# Patient Record
Sex: FEMALE | Race: AMERICAN INDIAN OR ALASKA NATIVE
[De-identification: names, ages, dates, MRNs, and addresses within clinical notes are randomized per-mention and may not be internally consistent; named-entity substitution may affect disease eponyms.]

---

## 2018-08-22 ENCOUNTER — HOSPITAL ENCOUNTER (INPATIENT)
Dept: HOSPITAL 5 - ED | Age: 68
LOS: 5 days | Discharge: NURSING, REHAB OR LONG-TERM CARE | DRG: 64 | End: 2018-08-27
Attending: INTERNAL MEDICINE | Admitting: INTERNAL MEDICINE
Payer: MEDICARE

## 2018-08-22 DIAGNOSIS — I10: ICD-10-CM

## 2018-08-22 DIAGNOSIS — E11.65: ICD-10-CM

## 2018-08-22 DIAGNOSIS — G81.94: ICD-10-CM

## 2018-08-22 DIAGNOSIS — G93.40: ICD-10-CM

## 2018-08-22 DIAGNOSIS — Z79.82: ICD-10-CM

## 2018-08-22 DIAGNOSIS — I63.9: Primary | ICD-10-CM

## 2018-08-22 DIAGNOSIS — Z79.899: ICD-10-CM

## 2018-08-22 DIAGNOSIS — Z82.49: ICD-10-CM

## 2018-08-22 DIAGNOSIS — E66.9: ICD-10-CM

## 2018-08-22 DIAGNOSIS — F03.90: ICD-10-CM

## 2018-08-22 DIAGNOSIS — I42.9: ICD-10-CM

## 2018-08-22 DIAGNOSIS — R29.702: ICD-10-CM

## 2018-08-22 LAB
APTT BLD: 26.9 SEC. (ref 24.2–36.6)
BASOPHILS # (AUTO): 0 K/MM3 (ref 0–0.1)
BASOPHILS NFR BLD AUTO: 0.4 % (ref 0–1.8)
BILIRUB UR QL STRIP: (no result)
BLOOD UR QL VISUAL: (no result)
BUN SERPL-MCNC: 10 MG/DL (ref 7–17)
BUN/CREAT SERPL: 17 %
CALCIUM SERPL-MCNC: 10.1 MG/DL (ref 8.4–10.2)
EOSINOPHIL # BLD AUTO: 0.1 K/MM3 (ref 0–0.4)
EOSINOPHIL NFR BLD AUTO: 1.2 % (ref 0–4.3)
HCT VFR BLD CALC: 46.6 % (ref 30.3–42.9)
HEMOLYSIS INDEX: 52
HGB BLD-MCNC: 15.5 GM/DL (ref 10.1–14.3)
HYALINE CASTS #/AREA URNS LPF: 1 /LPF
INR PPP: 1.12 (ref 0.87–1.13)
LYMPHOCYTES # BLD AUTO: 2.4 K/MM3 (ref 1.2–5.4)
LYMPHOCYTES NFR BLD AUTO: 28.9 % (ref 13.4–35)
MCH RBC QN AUTO: 27 PG (ref 28–32)
MCHC RBC AUTO-ENTMCNC: 33 % (ref 30–34)
MCV RBC AUTO: 80 FL (ref 79–97)
MONOCYTES # (AUTO): 0.4 K/MM3 (ref 0–0.8)
MONOCYTES % (AUTO): 4.6 % (ref 0–7.3)
MUCOUS THREADS #/AREA URNS HPF: (no result) /HPF
PH UR STRIP: 5 [PH] (ref 5–7)
PLATELET # BLD: 224 K/MM3 (ref 140–440)
PROT UR STRIP-MCNC: (no result) MG/DL
RBC # BLD AUTO: 5.79 M/MM3 (ref 3.65–5.03)
RBC #/AREA URNS HPF: 2 /HPF (ref 0–6)
T4 FREE SERPL-MCNC: 1.28 NG/DL (ref 0.76–1.46)
UROBILINOGEN UR-MCNC: < 2 MG/DL (ref ?–2)
WBC #/AREA URNS HPF: 2 /HPF (ref 0–6)

## 2018-08-22 PROCEDURE — 80061 LIPID PANEL: CPT

## 2018-08-22 PROCEDURE — 87086 URINE CULTURE/COLONY COUNT: CPT

## 2018-08-22 PROCEDURE — 72125 CT NECK SPINE W/O DYE: CPT

## 2018-08-22 PROCEDURE — 83735 ASSAY OF MAGNESIUM: CPT

## 2018-08-22 PROCEDURE — 84443 ASSAY THYROID STIM HORMONE: CPT

## 2018-08-22 PROCEDURE — 82550 ASSAY OF CK (CPK): CPT

## 2018-08-22 PROCEDURE — 70450 CT HEAD/BRAIN W/O DYE: CPT

## 2018-08-22 PROCEDURE — 80048 BASIC METABOLIC PNL TOTAL CA: CPT

## 2018-08-22 PROCEDURE — 85670 THROMBIN TIME PLASMA: CPT

## 2018-08-22 PROCEDURE — 36415 COLL VENOUS BLD VENIPUNCTURE: CPT

## 2018-08-22 PROCEDURE — 82962 GLUCOSE BLOOD TEST: CPT

## 2018-08-22 PROCEDURE — 85730 THROMBOPLASTIN TIME PARTIAL: CPT

## 2018-08-22 PROCEDURE — 93306 TTE W/DOPPLER COMPLETE: CPT

## 2018-08-22 PROCEDURE — 84484 ASSAY OF TROPONIN QUANT: CPT

## 2018-08-22 PROCEDURE — 70544 MR ANGIOGRAPHY HEAD W/O DYE: CPT

## 2018-08-22 PROCEDURE — 85610 PROTHROMBIN TIME: CPT

## 2018-08-22 PROCEDURE — 82607 VITAMIN B-12: CPT

## 2018-08-22 PROCEDURE — 84439 ASSAY OF FREE THYROXINE: CPT

## 2018-08-22 PROCEDURE — 85025 COMPLETE CBC W/AUTO DIFF WBC: CPT

## 2018-08-22 PROCEDURE — 82747 ASSAY OF FOLIC ACID RBC: CPT

## 2018-08-22 PROCEDURE — 70551 MRI BRAIN STEM W/O DYE: CPT

## 2018-08-22 PROCEDURE — 83036 HEMOGLOBIN GLYCOSYLATED A1C: CPT

## 2018-08-22 PROCEDURE — 93880 EXTRACRANIAL BILAT STUDY: CPT

## 2018-08-22 PROCEDURE — 81001 URINALYSIS AUTO W/SCOPE: CPT

## 2018-08-22 PROCEDURE — 93010 ELECTROCARDIOGRAM REPORT: CPT

## 2018-08-22 PROCEDURE — 82140 ASSAY OF AMMONIA: CPT

## 2018-08-22 PROCEDURE — 95819 EEG AWAKE AND ASLEEP: CPT

## 2018-08-22 PROCEDURE — 93005 ELECTROCARDIOGRAM TRACING: CPT

## 2018-08-22 RX ADMIN — ACETAMINOPHEN PRN MG: 325 TABLET ORAL at 22:51

## 2018-08-22 NOTE — HISTORY AND PHYSICAL REPORT
History of Present Illness


Chief complaint: 





She is confused and weak


History of present illness: 


68 YO Female with Obesity, HTN presents to ED for evaluation. Pt is unable to 

provide detailed history, but history provided by her daughter who is at 

bedside during exam and interview. As per daughter, the patient has experienced 

confusion, and Left sided weakness over the past 2 days with persistent 

symptoms over the last 1 day.  No reports of fever, chills, CP, Palpitations, 

NVD, Trauma, Falls, skin rash, productive cough, or recent ill contacts. Pt 

seen and evaluated in ED and found to have symptoms consistent with CVA and 

Encephalopathy. Pt is outside therapeutic window for TPA. Pt admitted to 

telemetry and initiated on CVA protocol. 





Past History


Past Medical History: hypertension


Past Surgical History: bowel surgery


Social history: single.  denies: smoking, alcohol abuse, prescription drug abuse


Family history: hypertension





Medications and Allergies


 Allergies











Allergy/AdvReac Type Severity Reaction Status Date / Time


 


No Known Allergies Allergy   Unverified 08/22/18 09:47











 Home Medications











 Medication  Instructions  Recorded  Confirmed  Last Taken  Type


 


Amlodipine Besylate [Norvasc] 10 mg PO QDAY 08/22/18 08/22/18 Unknown History


 


Aspirin [Adult Aspirin] 81 mg PO QDAY 08/22/18 08/22/18 Unknown History


 


Fosinopril Sodium 40 mg PO QDAY 08/22/18 08/22/18 Unknown History


 


Metoprolol Succinate [Toprol Xl] 50 mg PO QDAY 08/22/18 08/22/18 Unknown History














Review of Systems


ROS unobtainable: due to mental status





Exam





- Constitutional


Vitals: 


 











Temp Pulse Resp BP Pulse Ox


 


 98.5 F   97 H  24   165/105   96 


 


 08/22/18 09:40  08/22/18 10:30  08/22/18 10:30  08/22/18 10:30  08/22/18 10:30











General appearance: Present: mild distress





- EENT


ENT: hearing intact, clear oral mucosa





- Neck


Neck: Present: supple, normal ROM





- Respiratory


Respiratory effort: normal


Respiratory: bilateral: CTA





- Cardiovascular


Heart Sounds: Present: S1 & S2.  Absent: rub, click





- Extremities


Extremities: pulses symmetrical, No edema


Peripheral Pulses: within normal limits





- Abdominal


General gastrointestinal: Present: soft, non-tender, non-distended, normal 

bowel sounds


Female genitourinary: Present: normal





- Integumentary


Integumentary: Present: clear, warm, dry





- Musculoskeletal


Musculoskeletal: strength equal bilaterally, left sided weakness





- Psychiatric


Psychiatric: no appropriate mood/affect, no intact judgment & insight, no 

memory intact





- Neurologic


Neurologic: CNII-XII intact, moves all extremities, no gait normal





Results





- Labs


CBC & Chem 7: 


 08/22/18 09:56





 08/22/18 09:56


Labs: 


 Abnormal lab results











  08/22/18 08/22/18 08/22/18 Range/Units





  09:49 09:56 09:56 


 


RBC   5.79 H   (3.65-5.03)  M/mm3


 


Hgb   15.5 H   (10.1-14.3)  gm/dl


 


Hct   46.6 H   (30.3-42.9)  %


 


MCH   27 L   (28-32)  pg


 


RDW   15.3 H   (13.2-15.2)  %


 


PT    15.0 H  (12.2-14.9)  Sec.


 


Carbon Dioxide     (22-30)  mmol/L


 


Creatinine     (0.7-1.2)  mg/dL


 


Glucose     ()  mg/dL


 


POC Glucose  281 H    ()  














  08/22/18 Range/Units





  09:56 


 


RBC   (3.65-5.03)  M/mm3


 


Hgb   (10.1-14.3)  gm/dl


 


Hct   (30.3-42.9)  %


 


MCH   (28-32)  pg


 


RDW   (13.2-15.2)  %


 


PT   (12.2-14.9)  Sec.


 


Carbon Dioxide  20 L  (22-30)  mmol/L


 


Creatinine  0.6 L  (0.7-1.2)  mg/dL


 


Glucose  260 H  ()  mg/dL


 


POC Glucose   ()  














Assessment and Plan





- Patient Problems


(1) CVA (cerebral vascular accident)


Current Visit: Yes   Status: Acute   


Qualifiers: 


   Precerebral and cerebral artery: middle cerebral artery   Laterality of 

affected vessel: right 


Plan to address problem: 


CT Head, MRI brain, MRA Brain, Echo, EEG, CArotid doppler, Lipid panel, statin 

therapy, antiplatelet therapy, seizure precautions, neuro checks. 








(2) Left hemiparesis


Current Visit: Yes   Status: Acute   


Plan to address problem: 


PT consulted, supportive care. 








(3) HTN (hypertension)


Current Visit: Yes   Status: Acute   


Qualifiers: 


   Hypertension type: essential hypertension   Qualified Code(s): I10 - 

Essential (primary) hypertension   


Plan to address problem: 


Monitor BP q shift, permissive hypertension overnight. 








(4) Encephalopathy


Current Visit: Yes   Status: Acute   


Plan to address problem: 


CT head, MRI brain, neuro checks, supportive care, thyroid panel








(5) DVT prophylaxis


Current Visit: Yes   Status: Acute

## 2018-08-22 NOTE — EMERGENCY DEPARTMENT REPORT
ED Neuro Deficit HPI





- General


Chief Complaint: Neuro Symptoms/Deficit


Stated Complaint: LT LEG PAIN


Time Seen by Provider: 08/22/18 10:13


Source: patient, family, RN notes reviewed


Mode of arrival: Wheelchair


Limitations: No Limitations





- History of Present Illness


Initial Comments: 





This is a 67-year-old female who is not known to this provider previously.  The 

patient recently moved here from [place].  She apparently has a history of 

hypertension.  She does not have a local primary care doctor.


History is obtained by speaking to her daughter, Ms. Christiana Kim; [phone]





The patient is brought to the hospital by her daughter for evaluation of 

confusion and altered mental status and left-sided weakness.  The symptoms have 

been going on for the past 2 days.  Apparently they are constant.  The patient 

is altered and cannot describe radiation, exacerbating or relieving factors.  

Patient has been having slurred speech, increased confusion, and an unsteady 

gait, and apparently had a mechanical fall 2 days ago.  No fevers or chills, no 

cough.





-: days(s)


Location: speech, left arm, left leg


Presenting Symptoms: Present: Weak/Paralyzed One Side, Altered Mental Status


History of same: No


Place: home


Severity: moderate


Quality: other


Improves With: other


Worsens With: other


Context: gradual onset, recent fall, other


Associated Symptoms: confusion





- Related Data


Home Medications: 


 Home Medications











 Medication  Instructions  Recorded  Confirmed  Last Taken


 


Amlodipine Besylate [Norvasc] 10 mg PO QDAY 08/22/18 08/22/18 Unknown


 


Aspirin [Adult Aspirin] 81 mg PO QDAY 08/22/18 08/22/18 Unknown


 


Fosinopril Sodium 40 mg PO QDAY 08/22/18 08/22/18 Unknown


 


Metoprolol Succinate [Toprol Xl] 50 mg PO QDAY 08/22/18 08/22/18 Unknown











Allergies/Adverse Reactions: 


 Allergies











Allergy/AdvReac Type Severity Reaction Status Date / Time


 


No Known Allergies Allergy   Unverified 08/22/18 09:47














ED Review of Systems


ROS: 


Stated complaint: LT LEG PAIN


Other details as noted in HPI





Comment: Unobtainable due to pts medical conditions


Constitutional: malaise


Cardiovascular: denies: syncope


Gastrointestinal: denies: vomiting


Neurological: weakness, confusion





ED Past Medical Hx





- Past Medical History


Previous Medical History?: Yes


Hx Hypertension: Yes





- Surgical History


Past Surgical History?: Yes


Additional Surgical History: abdominal surgery





- Social History


Smoking Status: Never Smoker


Substance Use Type: None





- Medications


Home Medications: 


 Home Medications











 Medication  Instructions  Recorded  Confirmed  Last Taken  Type


 


Amlodipine Besylate [Norvasc] 10 mg PO QDAY 08/22/18 08/22/18 Unknown History


 


Aspirin [Adult Aspirin] 81 mg PO QDAY 08/22/18 08/22/18 Unknown History


 


Fosinopril Sodium 40 mg PO QDAY 08/22/18 08/22/18 Unknown History


 


Metoprolol Succinate [Toprol Xl] 50 mg PO QDAY 08/22/18 08/22/18 Unknown History














ED Neuro Physical Exam





- General


Limitations: Altered Mental Status, Physical Limitation


General appearance: alert, in no apparent distress


Suspected Stroke: Yes





- Head


Head exam: Present: atraumatic, normocephalic





- Eye


Eye exam: Present: normal appearance, EOMI, other (visual acuity intact to 

finger counting, color perception, reading at a close distance).  Absent: 

nystagmus





- ENT


ENT exam: Present: normal exam, normal orophraynx, mucous membranes moist, 

normal external ear exam





- Neck


Neck exam: Present: normal inspection, full ROM.  Absent: tenderness, 

meningismus





- Respiratory


Respiratory exam: Present: normal lung sounds bilaterally.  Absent: respiratory 

distress





- Cardiovascular


Cardiovascular Exam: Present: normal rhythm, tachycardia, normal heart sounds.  

Absent: systolic murmur, diastolic murmur, rubs, gallop





- GI/Abdominal


GI/Abdominal exam: Present: soft.  Absent: distended, tenderness, guarding, 

rigid, pulsatile mass





- Extremities Exam


Extremities exam: Present: normal inspection, full ROM, normal capillary refill

, other (2+ pulses noted in the bilateral upper, lower extremities.  

Compartments soft.  No long bony tenderness.  The pelvis is stable.).  Absent: 

pedal edema, joint swelling, calf tenderness





- Back Exam


Back exam: Present: normal inspection, full ROM.  Absent: tenderness, CVA 

tenderness (R), paraspinal tenderness, vertebral tenderness





- Neurological Exam


Neurological exam: Present: alert, oriented X3, motor sensory deficit (there is 

4 out of 5 strength left upper extremity.  There is 5 out of 5 right upper 

extremity, right lower extremity.  There is 5 out of 5 strength left lower 

extremity.  Sensation is intact to light touch in 4 extremities)





- NIHSS


Assessment Interval: Baseline


1a. Level of Consciousness: alert


1b. LOC Questions: answers 1 question correctly


1c. LOC Commands: performs tasks correctly


2. Best Gaze: normal


3. Visual: no visual loss


4. Facial Palsy: normal symmetrical movement


5b. Motor Arm Right: no drift


5a. Motor Arm Left: drift


6a. Motor Leg Left: no drift


6b. Motor Leg Right: no drift


7. Limb Ataxia: absent


8. Sensory: normal


9. Best Language: no aphasia


10. Dysarthria: normal


11. Extinction/Inattention: no abnormality


Total Score: 2


Stroke Severity: Minor Stroke





- Psychiatric


Psychiatric exam: Present: normal affect, normal mood





- Skin


Skin exam: Present: warm, dry, intact, normal color.  Absent: rash





ED Course


 Vital Signs











  08/22/18 08/22/18





  09:40 10:30


 


Temperature 98.5 F 


 


Pulse Rate 100 H 97 H


 


Respiratory 20 24





Rate  


 


Blood Pressure 192/108 165/105


 


O2 Sat by Pulse 98 96





Oximetry  














- Reevaluation(s)


Reevaluation #1: 





08/22/18 12:03


CT scan of the brain is negative for acute disease.  CT scan of the cervical 

spine is negative for acute disease.  The Hospital physician, Dr. Ye 

accepted the patient to the medical service.





- Lab Data


Result diagrams: 


 08/22/18 09:56





 08/22/18 09:56


 Lab Results











  08/22/18 08/22/18 08/22/18 Range/Units





  09:49 09:56 09:56 


 


WBC   8.3   (4.5-11.0)  K/mm3


 


RBC   5.79 H   (3.65-5.03)  M/mm3


 


Hgb   15.5 H   (10.1-14.3)  gm/dl


 


Hct   46.6 H   (30.3-42.9)  %


 


MCV   80   (79-97)  fl


 


MCH   27 L   (28-32)  pg


 


MCHC   33   (30-34)  %


 


RDW   15.3 H   (13.2-15.2)  %


 


Plt Count   224   (140-440)  K/mm3


 


Lymph % (Auto)   28.9   (13.4-35.0)  %


 


Mono % (Auto)   4.6   (0.0-7.3)  %


 


Eos % (Auto)   1.2   (0.0-4.3)  %


 


Baso % (Auto)   0.4   (0.0-1.8)  %


 


Lymph #   2.4   (1.2-5.4)  K/mm3


 


Mono #   0.4   (0.0-0.8)  K/mm3


 


Eos #   0.1   (0.0-0.4)  K/mm3


 


Baso #   0.0   (0.0-0.1)  K/mm3


 


Seg Neutrophils %   64.9   (40.0-70.0)  %


 


Seg Neutrophils #   5.4   (1.8-7.7)  K/mm3


 


PT    15.0 H  (12.2-14.9)  Sec.


 


INR    1.12  (0.87-1.13)  


 


APTT    26.9  (24.2-36.6)  Sec.


 


Thrombin Time    17.0  (15.1-19.6)  Sec.


 


Sodium     (137-145)  mmol/L


 


Potassium     (3.6-5.0)  mmol/L


 


Chloride     ()  mmol/L


 


Carbon Dioxide     (22-30)  mmol/L


 


Anion Gap     mmol/L


 


BUN     (7-17)  mg/dL


 


Creatinine     (0.7-1.2)  mg/dL


 


Estimated GFR     ml/min


 


BUN/Creatinine Ratio     %


 


Glucose     ()  mg/dL


 


POC Glucose  281 H    ()  


 


Calcium     (8.4-10.2)  mg/dL


 


Magnesium     (1.7-2.3)  mg/dL


 


Total Creatine Kinase     ()  units/L


 


Troponin T     (0.00-0.029)  ng/mL














  08/22/18 08/22/18 Range/Units





  09:56 09:56 


 


WBC    (4.5-11.0)  K/mm3


 


RBC    (3.65-5.03)  M/mm3


 


Hgb    (10.1-14.3)  gm/dl


 


Hct    (30.3-42.9)  %


 


MCV    (79-97)  fl


 


MCH    (28-32)  pg


 


MCHC    (30-34)  %


 


RDW    (13.2-15.2)  %


 


Plt Count    (140-440)  K/mm3


 


Lymph % (Auto)    (13.4-35.0)  %


 


Mono % (Auto)    (0.0-7.3)  %


 


Eos % (Auto)    (0.0-4.3)  %


 


Baso % (Auto)    (0.0-1.8)  %


 


Lymph #    (1.2-5.4)  K/mm3


 


Mono #    (0.0-0.8)  K/mm3


 


Eos #    (0.0-0.4)  K/mm3


 


Baso #    (0.0-0.1)  K/mm3


 


Seg Neutrophils %    (40.0-70.0)  %


 


Seg Neutrophils #    (1.8-7.7)  K/mm3


 


PT    (12.2-14.9)  Sec.


 


INR    (0.87-1.13)  


 


APTT    (24.2-36.6)  Sec.


 


Thrombin Time    (15.1-19.6)  Sec.


 


Sodium  139   (137-145)  mmol/L


 


Potassium  4.6   (3.6-5.0)  mmol/L


 


Chloride  101.5   ()  mmol/L


 


Carbon Dioxide  20 L   (22-30)  mmol/L


 


Anion Gap  22   mmol/L


 


BUN  10   (7-17)  mg/dL


 


Creatinine  0.6 L   (0.7-1.2)  mg/dL


 


Estimated GFR  > 60   ml/min


 


BUN/Creatinine Ratio  17   %


 


Glucose  260 H   ()  mg/dL


 


POC Glucose    ()  


 


Calcium  10.1   (8.4-10.2)  mg/dL


 


Magnesium   2.00  (1.7-2.3)  mg/dL


 


Total Creatine Kinase   49  ()  units/L


 


Troponin T  < 0.010   (0.00-0.029)  ng/mL














- EKG Data


-: EKG Interpreted by Me





08/22/18 11:28


Sinus, 95 bpm, left axis deviation, left anterior fascicular block, QTC 

prolonged, abnormal EKG, high left ventricular voltage, not a stemi





- Radiology Data


Radiology results: report reviewed, image reviewed





- Medical Decision Making





Differential diagnosis, including but not limited to: Stroke, urinary tract 

infection, electrolyte derangement, intracranial hemorrhage, cervical spine 

injury














Assessment and plan: 67-year-old female with reported antecedent confusion, and 

left arm weakness and subsequent fall.  Not a TPA candidate as she presented 

more than 4.5 hours after symptom onset.  Does not require emergent 

endovascular imaging given that she presented more than 24 hours after symptom 

onset.  CT scan of the brain and cervical spine interpretations pending.  

Laboratory studies reviewed and are unremarkable as of so far.  Urinalysis is 

pending.  Patient will require admission.  This was discussed with her 

daughter.  She is amenable to this.








- Core Measures


Measure Exclusions: not indicated





- Thrombolytic Inclusion/Exclusion


Thrombolytic Exclusion Criteria: Symptom Onset > 3 Hours


Critical care attestation.: 


If time is entered above; I have spent that time in minutes in the direct care 

of this critically ill patient, excluding procedure time.








ED Disposition


Clinical Impression: 


 Encephalopathy, Left-sided muscle weakness





Disposition: DC-09 OP ADMIT IP TO THIS HOSP


Is pt being admited?: Yes


Does the pt Need Aspirin: Yes


Condition: Good


Referrals: 


PRIMARY CARE,MD [Primary Care Provider] - 3-5 Days

## 2018-08-22 NOTE — CAT SCAN REPORT
CT head without contrast:



Fall; neuro deficits.



Axial images demonstrates an area of diminished attenuation just medial 

to the temporal lobe lacunar sylvian fissure. There is generalized mild 

enlargement of the peripheral sulci. There is decreased periventricular 

white matter attenuation. The ventricles are normal in size location 

and contour. No extracerebral collections and no hemorrhage. The 

visualized bony structures and paranasal sinuses are unremarkable. No 

scalp swelling. No prior exam for comparison.



Impression:



1. Senescent changes.



2. Nonacute right temporal lobe infarction.



Cervical spine CT without contrast:



Trauma, pain.



Transverse images are obtained from the skull base through T2. Coronal 

and sagittal reformatted images included.



There is diffuse anterior spondylosis with mild posterior spondylosis 

at C5-6. The interspaces from C3-C7 are all narrowed. There is minimal 

anterior C7 subluxation on T1. There is a cleavage in the anterior body 

of C1. There are proliferative degenerative apophyseal joint changes on 

the left at C2-3 and on the right at C3-4. There is mild foraminal 

narrowing on the right at C45 and on the left at C5-6. No evidence of 

spinal stenosis. No fracture deformities.



Impressions:



1. Multilevel degenerative spurring.



2. Multilevel discogenic narrowing.



3. Bilateral foraminal stenoses as described above.



4. No acute findings.

## 2018-08-23 LAB — HDLC SERPL-MCNC: 38 MG/DL (ref 40–59)

## 2018-08-23 RX ADMIN — ASPIRIN SCH MG: 325 TABLET ORAL at 10:46

## 2018-08-23 RX ADMIN — DONEPEZIL HYDROCHLORIDE SCH MG: 5 TABLET, FILM COATED ORAL at 21:23

## 2018-08-23 RX ADMIN — ACETAMINOPHEN PRN MG: 325 TABLET ORAL at 10:47

## 2018-08-23 RX ADMIN — ACETAMINOPHEN PRN MG: 325 TABLET ORAL at 21:22

## 2018-08-23 NOTE — PROGRESS NOTE
Assessment and Plan


Assessment and plan: 


68 YO Female with Obesity, HTN presents to ED for evaluation. Pt is unable to 

provide detailed history, but history provided by her daughter who is at 

bedside during exam and interview. As per daughter, the patient has experienced 

confusion, and Left sided weakness over the past 2 days with persistent 

symptoms over the last 1 day.  No reports of fever, chills, CP, Palpitations, 

NVD, Trauma, Falls, skin rash, productive cough, or recent ill contacts. Pt 

seen and evaluated in ED and found to have symptoms consistent with CVA and 

Encephalopathy. Pt is outside therapeutic window for TPA. Pt admitted to 

telemetry and initiated on CVA protocol. 





(1) CVA (cerebral vascular accident)


Current Visit: Yes   Status: Acute   


Qualifiers: 


   Precerebral and cerebral artery: middle cerebral artery   Laterality of 

affected vessel: right 


Plan to address problem: 


CT Head, MRI brain, MRA Brain-REVIEWED.


Subacute cva noted


Other work up pending Echo, EEG, Carotid doppler, Lipid panel, 


Continue statin therapy, antiplatelet therapy, seizure precautions, neuro 

checks. 








(2) Left hemiparesis


Current Visit: Yes   Status: Acute   


Plan to address problem: 


PT consulted, supportive care. 








(3) HTN (hypertension)


Current Visit: Yes   Status: Acute   


Qualifiers: 


   Hypertension type: essential hypertension   Qualified Code(s): I10 - 

Essential (primary) hypertension   


Plan to address problem: 


Monitor BP q shift, permissive hypertension overnight. 








(4) Encephalopathy


Current Visit: Yes   Status: Acute   


Plan to address problem: 


aS NOTED ABOVE, neuro checks, supportive care, thyroid panel


Notified about a fall. Re-oriented patient and will request a sitter and also 

patient to be brought closer to the nursing station. 


PT/OT








(5) DVT prophylaxis


Current Visit: Yes   Status: Acute   


Plan discussed with family





History


Interval history: 


Patient seen and examined, remains confused. Daughter at bedside








Hospitalist Physical





- Physical exam


Narrative exam: 


General appearance: Present: mild distress





- EENT


ENT: hearing intact, clear oral mucosa





- Neck


Neck: Present: supple, normal ROM





- Respiratory


Respiratory effort: normal


Respiratory: bilateral: CTA





- Cardiovascular


Heart Sounds: Present: S1 & S2.  Absent: rub, click





- Extremities


Extremities: pulses symmetrical, No edema


Peripheral Pulses: within normal limits





- Abdominal


General gastrointestinal: Present: soft, non-tender, non-distended, normal 

bowel sounds


Female genitourinary: Present: normal





- Integumentary


Integumentary: Present: clear, warm, dry





- Musculoskeletal


Musculoskeletal: strength equal bilaterally, left sided weakness





- Psychiatric


Psychiatric: no appropriate mood/affect, no intact judgment & insight, no 

memory intact





- Neurologic


Neurologic: decreased motor strenght left upper and lower ext 3/5. Oriented 

only to person








- Constitutional


Vitals: 


 











Temp Pulse Resp BP Pulse Ox


 


 98.1 F   92 H  18   180/117   97 


 


 08/23/18 12:34  08/23/18 12:34  08/23/18 12:34  08/23/18 12:34  08/23/18 12:34











General appearance: Present: mild distress





Results





- Labs


CBC & Chem 7: 


 08/22/18 09:56





 08/22/18 09:56


Labs: 


 Laboratory Last Values











WBC  8.3 K/mm3 (4.5-11.0)   08/22/18  09:56    


 


RBC  5.79 M/mm3 (3.65-5.03)  H  08/22/18  09:56    


 


Hgb  15.5 gm/dl (10.1-14.3)  H  08/22/18  09:56    


 


Hct  46.6 % (30.3-42.9)  H  08/22/18  09:56    


 


MCV  80 fl (79-97)   08/22/18  09:56    


 


MCH  27 pg (28-32)  L  08/22/18  09:56    


 


MCHC  33 % (30-34)   08/22/18  09:56    


 


RDW  15.3 % (13.2-15.2)  H  08/22/18  09:56    


 


Plt Count  224 K/mm3 (140-440)   08/22/18  09:56    


 


Lymph % (Auto)  28.9 % (13.4-35.0)   08/22/18  09:56    


 


Mono % (Auto)  4.6 % (0.0-7.3)   08/22/18  09:56    


 


Eos % (Auto)  1.2 % (0.0-4.3)   08/22/18  09:56    


 


Baso % (Auto)  0.4 % (0.0-1.8)   08/22/18  09:56    


 


Lymph #  2.4 K/mm3 (1.2-5.4)   08/22/18  09:56    


 


Mono #  0.4 K/mm3 (0.0-0.8)   08/22/18  09:56    


 


Eos #  0.1 K/mm3 (0.0-0.4)   08/22/18  09:56    


 


Baso #  0.0 K/mm3 (0.0-0.1)   08/22/18  09:56    


 


Seg Neutrophils %  64.9 % (40.0-70.0)   08/22/18  09:56    


 


Seg Neutrophils #  5.4 K/mm3 (1.8-7.7)   08/22/18  09:56    


 


PT  15.0 Sec. (12.2-14.9)  H  08/22/18  09:56    


 


INR  1.12  (0.87-1.13)   08/22/18  09:56    


 


APTT  26.9 Sec. (24.2-36.6)   08/22/18  09:56    


 


Thrombin Time  17.0 Sec. (15.1-19.6)   08/22/18  09:56    


 


Sodium  139 mmol/L (137-145)   08/22/18  09:56    


 


Potassium  4.6 mmol/L (3.6-5.0)   08/22/18  09:56    


 


Chloride  101.5 mmol/L ()   08/22/18  09:56    


 


Carbon Dioxide  20 mmol/L (22-30)  L  08/22/18  09:56    


 


Anion Gap  22 mmol/L  08/22/18  09:56    


 


BUN  10 mg/dL (7-17)   08/22/18  09:56    


 


Creatinine  0.6 mg/dL (0.7-1.2)  L  08/22/18  09:56    


 


Estimated GFR  > 60 ml/min  08/22/18  09:56    


 


BUN/Creatinine Ratio  17 %  08/22/18  09:56    


 


Glucose  260 mg/dL ()  H  08/22/18  09:56    


 


POC Glucose  140  ()  H  08/23/18  12:39    


 


Calcium  10.1 mg/dL (8.4-10.2)   08/22/18  09:56    


 


Magnesium  2.00 mg/dL (1.7-2.3)   08/22/18  09:56    


 


Total Creatine Kinase  49 units/L ()   08/22/18  09:56    


 


Troponin T  < 0.010 ng/mL (0.00-0.029)   08/22/18  09:56    


 


Triglycerides  156 mg/dL (2-149)  H  08/23/18  05:11    


 


Cholesterol  244 mg/dL ()  H  08/23/18  05:11    


 


LDL Cholesterol Direct  192 mg/dL ()  H  08/23/18  05:11    


 


HDL Cholesterol  38 mg/dL (40-59)  L  08/23/18  05:11    


 


Cholesterol/HDL Ratio  6.42 %  08/23/18  05:11    


 


TSH  1.760 mlU/mL (0.270-4.200)   08/22/18  14:44    


 


Free T4  1.28 ng/dL (0.76-1.46)   08/22/18  14:44    


 


Urine Color  Yellow  (Yellow)   08/22/18  11:41    


 


Urine Turbidity  Slightly-cloudy  (Clear)   08/22/18  11:41    


 


Urine pH  5.0  (5.0-7.0)   08/22/18  11:41    


 


Ur Specific Gravity  1.018  (1.003-1.030)   08/22/18  11:41    


 


Urine Protein  <15 mg/dl mg/dL (Negative)   08/22/18  11:41    


 


Urine Glucose (UA)  >=500 mg/dL (Negative)   08/22/18  11:41    


 


Urine Ketones  Tr mg/dL (Negative)   08/22/18  11:41    


 


Urine Blood  Neg  (Negative)   08/22/18  11:41    


 


Urine Nitrite  Neg  (Negative)   08/22/18  11:41    


 


Urine Bilirubin  Neg  (Negative)   08/22/18  11:41    


 


Urine Urobilinogen  < 2.0 mg/dL (<2.0)   08/22/18  11:41    


 


Ur Leukocyte Esterase  Tr  (Negative)   08/22/18  11:41    


 


Urine WBC (Auto)  2.0 /HPF (0.0-6.0)   08/22/18  11:41    


 


Urine RBC (Auto)  2.0 /HPF (0.0-6.0)   08/22/18  11:41    


 


U Epithel Cells (Auto)  4.0 /HPF (0-13.0)   08/22/18  11:41    


 


Hyaline Casts  1 /LPF  08/22/18  11:41    


 


Urine Mucus  Few /HPF  08/22/18  11:41

## 2018-08-23 NOTE — MAGNETIC RESONANCE REPORT
MRI OF THE BRAIN WITHOUT CONTRAST:



HISTORY: Stroke



PROCEDURE:

Multiplanar, multisequence MR imaging of the brain without IV contrast 

was

performed.  



FINDINGS:

Compared to the CT head dated 8/22/18. A 1.1 cm focus of diffusion 

restriction is identified in the right side of the roly on diffusion 

image 13. No other areas of diffusion restriction are identified. No 

evidence for hemorrhage, mass or extra-axial fluid collection. No 

chronic infarct is identified.



Mild diffuse cortical volume loss and mild nonspecific chronic white 

matter changes are noted.



The midline structures are central.  The basal cisterns are patent. 

Normal ventricular size.



The orbital cavities and sella turcica demonstrate no abnormality.



The visualized paranasal sinuses and mastoid air cells are well aerated.



IMPRESSION:

1.1 cm subacute ischemic infarct in the right roly.

Volume loss.

Nonspecific chronic white matter changes.

## 2018-08-23 NOTE — MAGNETIC RESONANCE REPORT
MRA HEAD WITHOUT CONTRAST



HISTORY: Stroke.



Time-of-flight imaging with MIP reformations of the [place] of

[place] is submitted.



Slightly limited examination by motion artifact. Moderate to large 

bilateral posterior communicating arteries are identified. The arteries 

appear widely patent and free of hemodynamically

significant stenosis, aneurysm or dissection.  



IMPRESSION:

Unremarkable MRA head.

## 2018-08-24 RX ADMIN — DONEPEZIL HYDROCHLORIDE SCH MG: 5 TABLET, FILM COATED ORAL at 22:36

## 2018-08-24 RX ADMIN — ASPIRIN SCH MG: 325 TABLET ORAL at 12:09

## 2018-08-24 RX ADMIN — METOPROLOL SUCCINATE SCH MG: 50 TABLET, EXTENDED RELEASE ORAL at 12:09

## 2018-08-24 RX ADMIN — ACETAMINOPHEN PRN MG: 325 TABLET ORAL at 22:52

## 2018-08-24 RX ADMIN — AMLODIPINE BESYLATE SCH MG: 10 TABLET ORAL at 12:09

## 2018-08-24 NOTE — PHYSICIAN PROGRESS NOTE
SUBJECTIVE:  The patient was admitted because of possible stroke.  She has been

doing very well.  She has a small stroke only.  She has problem with the left

side.  She is stable, getting better and has not had any progressive worsening.



OBJECTIVE:  She is obese.  Same weakness on the left side.



DIAGNOSTIC STUDIES:  Her MRI showed a small lacunar ischemic infarct in the

right roly, which explain the weakness.



ASSESSMENT:  Cerebrovascular accident with small lacunar infarct in the roly.



PLAN:  Outlined by Dr. Ralph.  The patient should lose weight, follow that with

primary care doctor.  Cardiac care.





DD: 08/24/2018 13:44

DT: 08/24/2018 23:24

JOB# [id number]  [id number]

CHRISTINA/NTS

## 2018-08-24 NOTE — PROGRESS NOTE
Assessment and Plan


Assessment and plan: 


68 YO Female with Obesity, HTN presents to ED for evaluation. Pt is unable to 

provide detailed history, but history provided by her daughter who is at 

bedside during exam and interview. As per daughter, the patient has experienced 

confusion, and Left sided weakness over the past 2 days with persistent 

symptoms over the last 1 day.  No reports of fever, chills, CP, Palpitations, 

NVD, Trauma, Falls, skin rash, productive cough, or recent ill contacts. Pt 

seen and evaluated in ED and found to have symptoms consistent with CVA and 

Encephalopathy. Pt is outside therapeutic window for TPA. Pt admitted to 

telemetry and initiated on CVA protocol. 





(1) CVA (cerebral vascular accident)


Current Visit: Yes   Status: Acute   


Qualifiers: 


   Precerebral and cerebral artery: middle cerebral artery   Laterality of 

affected vessel: right 


Plan to address problem: 


CT Head, MRI brain, MRA Brain-REVIEWED.


Subacute cva noted


EF 40-45%


Continue statin therapy, antiplatelet therapy, seizure precautions, neuro 

checks. 








(2) Left hemiparesis


Current Visit: Yes   Status: Acute   


Plan to address problem: 


PT consulted, supportive care. 








(3) HTN (hypertension)


Current Visit: Yes   Status: Acute   


Qualifiers: 


   Hypertension type: essential hypertension   Qualified Code(s): I10 - 

Essential (primary) hypertension   


Plan to address problem: 


Monitor BP q shift, permissive hypertension overnight. 








(4) Encephalopathy


Current Visit: Yes   Status: Acute   


Plan to address problem: 


aS NOTED ABOVE, neuro checks, supportive care, thyroid panel


No further fall noted.


Pending placement


PT/OT








(5) DVT prophylaxis


Current Visit: Yes   Status: Acute   


Plan discussed with family





History


Interval history: 


Patient seen and examined, no other clinical changes reported.  Mental status 

mildly improved and orientation.  Patient acknowledges being in the hospital.








Hospitalist Physical





- Physical exam


Narrative exam: 


General appearance: Present: mild distress





- EENT


ENT: hearing intact, clear oral mucosa





- Neck


Neck: Present: supple, normal ROM





- Respiratory


Respiratory effort: normal


Respiratory: bilateral: CTA





- Cardiovascular


Heart Sounds: Present: S1 & S2.  Absent: rub, click





- Extremities


Extremities: pulses symmetrical, No edema


Peripheral Pulses: within normal limits





- Abdominal


General gastrointestinal: Present: soft, non-tender, non-distended, normal 

bowel sounds


Female genitourinary: Present: normal





- Integumentary


Integumentary: Present: clear, warm, dry





- Musculoskeletal


Musculoskeletal: strength equal bilaterally, left sided weakness





- Psychiatric


Psychiatric: no appropriate mood/affect, no intact judgment & insight, no 

memory intact





- Neurologic


Neurologic: decreased motor strenght left upper and lower ext 3/5. Oriented 

only to person and place








- Constitutional


Vitals: 


 











Temp Pulse Resp BP Pulse Ox


 


 98.2 F   76   20   180/86   96 


 


 08/24/18 05:31  08/24/18 07:43  08/24/18 07:43  08/24/18 07:43  08/24/18 14:19











General appearance: Present: mild distress





Results





- Labs


CBC & Chem 7: 


 08/22/18 09:56





 08/22/18 09:56


Labs: 


 Laboratory Last Values











WBC  8.3 K/mm3 (4.5-11.0)   08/22/18  09:56    


 


RBC  5.79 M/mm3 (3.65-5.03)  H  08/22/18  09:56    


 


Hgb  15.5 gm/dl (10.1-14.3)  H  08/22/18  09:56    


 


Hct  46.6 % (30.3-42.9)  H  08/22/18  09:56    


 


MCV  80 fl (79-97)   08/22/18  09:56    


 


MCH  27 pg (28-32)  L  08/22/18  09:56    


 


MCHC  33 % (30-34)   08/22/18  09:56    


 


RDW  15.3 % (13.2-15.2)  H  08/22/18  09:56    


 


Plt Count  224 K/mm3 (140-440)   08/22/18  09:56    


 


Lymph % (Auto)  28.9 % (13.4-35.0)   08/22/18  09:56    


 


Mono % (Auto)  4.6 % (0.0-7.3)   08/22/18  09:56    


 


Eos % (Auto)  1.2 % (0.0-4.3)   08/22/18  09:56    


 


Baso % (Auto)  0.4 % (0.0-1.8)   08/22/18  09:56    


 


Lymph #  2.4 K/mm3 (1.2-5.4)   08/22/18  09:56    


 


Mono #  0.4 K/mm3 (0.0-0.8)   08/22/18  09:56    


 


Eos #  0.1 K/mm3 (0.0-0.4)   08/22/18  09:56    


 


Baso #  0.0 K/mm3 (0.0-0.1)   08/22/18  09:56    


 


Seg Neutrophils %  64.9 % (40.0-70.0)   08/22/18  09:56    


 


Seg Neutrophils #  5.4 K/mm3 (1.8-7.7)   08/22/18  09:56    


 


PT  15.0 Sec. (12.2-14.9)  H  08/22/18  09:56    


 


INR  1.12  (0.87-1.13)   08/22/18  09:56    


 


APTT  26.9 Sec. (24.2-36.6)   08/22/18  09:56    


 


Thrombin Time  17.0 Sec. (15.1-19.6)   08/22/18  09:56    


 


Sodium  139 mmol/L (137-145)   08/22/18  09:56    


 


Potassium  4.6 mmol/L (3.6-5.0)   08/22/18  09:56    


 


Chloride  101.5 mmol/L ()   08/22/18  09:56    


 


Carbon Dioxide  20 mmol/L (22-30)  L  08/22/18  09:56    


 


Anion Gap  22 mmol/L  08/22/18  09:56    


 


BUN  10 mg/dL (7-17)   08/22/18  09:56    


 


Creatinine  0.6 mg/dL (0.7-1.2)  L  08/22/18  09:56    


 


Estimated GFR  > 60 ml/min  08/22/18  09:56    


 


BUN/Creatinine Ratio  17 %  08/22/18  09:56    


 


Glucose  260 mg/dL ()  H  08/22/18  09:56    


 


POC Glucose  149  ()  H  08/24/18  16:06    


 


Hemoglobin A1c  6.5 % (4-6)  H  08/23/18  19:12    


 


Calcium  10.1 mg/dL (8.4-10.2)   08/22/18  09:56    


 


Magnesium  2.00 mg/dL (1.7-2.3)   08/22/18  09:56    


 


Ammonia  28.0 umol/L (25-60)   08/23/18  19:04    


 


Total Creatine Kinase  49 units/L ()   08/22/18  09:56    


 


Troponin T  < 0.010 ng/mL (0.00-0.029)   08/22/18  09:56    


 


Triglycerides  156 mg/dL (2-149)  H  08/23/18  05:11    


 


Cholesterol  244 mg/dL ()  H  08/23/18  05:11    


 


LDL Cholesterol Direct  192 mg/dL ()  H  08/23/18  05:11    


 


HDL Cholesterol  38 mg/dL (40-59)  L  08/23/18  05:11    


 


Cholesterol/HDL Ratio  6.42 %  08/23/18  05:11    


 


Vitamin B12  1095 pg/mL (211-911)  H  08/23/18  19:12    


 


TSH  1.760 mlU/mL (0.270-4.200)   08/22/18  14:44    


 


Free T4  1.28 ng/dL (0.76-1.46)   08/22/18  14:44    


 


Urine Color  Yellow  (Yellow)   08/22/18  11:41    


 


Urine Turbidity  Slightly-cloudy  (Clear)   08/22/18  11:41    


 


Urine pH  5.0  (5.0-7.0)   08/22/18  11:41    


 


Ur Specific Gravity  1.018  (1.003-1.030)   08/22/18  11:41    


 


Urine Protein  <15 mg/dl mg/dL (Negative)   08/22/18  11:41    


 


Urine Glucose (UA)  >=500 mg/dL (Negative)   08/22/18  11:41    


 


Urine Ketones  Tr mg/dL (Negative)   08/22/18  11:41    


 


Urine Blood  Neg  (Negative)   08/22/18  11:41    


 


Urine Nitrite  Neg  (Negative)   08/22/18  11:41    


 


Urine Bilirubin  Neg  (Negative)   08/22/18  11:41    


 


Urine Urobilinogen  < 2.0 mg/dL (<2.0)   08/22/18  11:41    


 


Ur Leukocyte Esterase  Tr  (Negative)   08/22/18  11:41    


 


Urine WBC (Auto)  2.0 /HPF (0.0-6.0)   08/22/18  11:41    


 


Urine RBC (Auto)  2.0 /HPF (0.0-6.0)   08/22/18  11:41    


 


U Epithel Cells (Auto)  4.0 /HPF (0-13.0)   08/22/18  11:41    


 


Hyaline Casts  1 /LPF  08/22/18  11:41    


 


Urine Mucus  Few /HPF  08/22/18  11:41

## 2018-08-24 NOTE — CONSULTATION
NEUROLOGIC CONSULTATION



HISTORY OF PRESENT ILLNESS:  The patient is a 67-year-old unknown handedness

female from [facility], who was prepared for possibility of a stroke, but the

patient presented with confusion and weakness.  The patient could not give any

history and therefore, I had to get a history from the admitting physician. 

Fortunately, the physician was able to talk to the daughter, who was at the

bedside during that examination.  Apparently, the patient started to be having

some confusion and left-sided weakness for 2 days prior to admission.  The

symptoms had become persistent one day prior to admission.  The patient,

however, did not have any fever, chest pain, or palpitations.  No nausea,

vomiting, trauma, coughing, etc.  She was then brought to the Emergency Room. 

In the Emergency Department, it was found out that she had symptoms consistent

with cerebrovascular disease and encephalopathy.  The patient was not given TPA

because she was outside the therapeutic window.  She was then admitted.



I tried to get as much history from the patient, but it is very difficult.  She

does not even know why she is here.



PAST MEDICAL HISTORY:  Hypertension.



PAST SURGICAL HISTORY:  Bowel surgery, nature unknown.



SOCIAL HISTORY:  The patient is single.  Denies any smoking.  No alcohol abuse. 

No illegal drugs.



FAMILY HISTORY:  Positive for hypertension.



ALLERGIES:  No known allergies.



PHYSICAL EXAMINATION:

GENERAL:  Revealed a well-developed and well-nourished looking female, who is

obese.

VITAL SIGNS:  Temperature 98.6, pulse rate 97, respirations 20, blood pressure

165/105, and pulse oximetry 96.

HEAD, EYES, EARS, NOSE, MOUTH and THROAT:  Unremarkable.  No intracranial or

intraorbital bleed.

NECK:  Supple.  No carotid bruit.

HEART:  Regular rhythm, distant sounds.  No murmur.

LUNGS:  Decreased breath sounds.

ABDOMEN:  Obese with voluntary muscular guarding.

EXTREMITIES:  Showed subtle pedal edema.  Good peripheral pulsation.

NEUROLOGIC:  The patient is awake, alert, and communicative; however, I believe

the patient is mildly demented.  She knew her name, but did not know her age,

she does not know what happened to her or her location.  Her memory is poor. 

She could not give me any history at all of what is going on with her.  Speech,

however, is fluent.  Cranial nerve examination is unremarkable, although

limited.  Motor examination, 5/5 strength in the right upper and right lower

extremities.  The left upper extremity strength is 4/5.  I could overcome her

arm against gravity.  Left lower extremity is basically the same.  Sensory

testing is difficult.  Coordination is intact.  Reflexes are symmetrical on both

sides.  She has ____ Babinski on the left.



INITIAL CLINICAL IMPRESSION:  This patient has evidence of right-sided

hemispheric dysfunction.  I suspect this is a cerebrovascular accident.  It may

well be a lacunar infarct, could be in the brain stem or basal ganglia.  I do

not see any big stroke.  The patient also has evidence of dementia, although

mild to moderate.



RECOMMENDATIONS:

1.  We will review brain MRI and brain MRA.

2.  2D echocardiogram.

3.  Carotid artery study.

4.  EKG.

5.  Check a B12, thyroid profile, folate level, ammonia, vitamin D3 and EEG. 

Consider antiplatelet.  The patient was given aspirin.

6.  Control blood pressure.

7.  I believe we can start this patient on Aricept 5 mg at bedtime.  Later on to

add Namenda, to slow increasing dose from 10 mg daily, if it is possible she can

be given Namzaric, which is a combination of Aricept and Namenda.  This should

be given by the neurologist on the outside and followed up.



Prognosis looks fair to good.



Sixty minutes involving the history and physical examination and gathering of

information and more than 50% in the coordination of care.





DD: 08/23/2018 18:37

DT: 08/24/2018 02:24

JOB# [id number]  [id number]

AYDEE/SUSANA

## 2018-08-24 NOTE — DISCHARGE SUMMARY
Providers





- Providers


Date of Admission: 


08/22/18 12:50





Attending physician: 


DARLEEN SOW MD





 





08/22/18 11:39


Occupational Therapy Evaluate and Treat [CONS] Routine 


   Comment: 


   Reason For Exam: Neuro deficits


Physical Therapy Evaluation and Treat [CONS] Routine 


   Comment: 


   Reason For Exam: Neuro deficits





08/22/18 11:40


Speech Therapy Evaluation and Treat [CONS] Routine 


   Reason For Exam: swallow eval





08/23/18 09:04


Consult to Physician [CONS] Routine 


   Comment: 


   Consulting Provider: KAMRYN VILLAFUERTE


   Physician Instructions: 


   Reason For Exam: ams, TIA











Primary care physician: 


PRIMARY CARE MD








Hospitalization


Reason for admission: CVA


Condition: Stable


Hospital course: 


66 YO Female with Obesity, HTN presents to ED for evaluation. Pt is unable to 

provide detailed history, but history provided by her daughter who is at 

bedside during exam and interview. As per daughter, the patient has experienced 

confusion, and Left sided weakness over the past 2 days with persistent 

symptoms over the last 1 day.  No reports of fever, chills, CP, Palpitations, 

NVD, Trauma, Falls, skin rash, productive cough, or recent ill contacts. Pt 

seen and evaluated in ED and found to have symptoms consistent with CVA and 

Encephalopathy. Pt is outside therapeutic window for TPA. Pt admitted to 

telemetry and initiated on CVA protocol.  Imaging study confirmed a subacute 

CVA.  Patient had persistent left hemiparesis although this continued to 

improve.  Mental status improved probably back to baseline as patient has 

underlying dementia and is currently pending workup for it by neurology 

according to family.  She was noted to have elevated blood sugar with an A1c of 

6.5.  Metformin.  Also aspirin was increased and patient was started on statin 

with low dose Ace due to cardiomyopathy as noted on echocardiogram ejection 

fraction of 40-45%.  Recommended an outpatient cardiology evaluation








(1) CVA (cerebral vascular accident)


(2) Left hemiparesis


(3) HTN (hypertension)


(4) Encephalopathy with underlying dementia


(5) Diabetes Mellitus


(6) Cardiomyopathy








Disposition: [id number] SNF [facility] CERT


Time spent for discharge: 35 mins





Core Measure Documentation





- Palliative Care


Palliative Care/ Comfort Measures: Not Applicable





- Core Measures


Any of the following diagnoses?: none





- VTE Discharge Requirements


Deep Vein Thrombosis/Pulmonary Embolism Present on Admission: No





Exam





- Physical Exam


Narrative exam: 


General appearance: Present: mild distress





- EENT


ENT: hearing intact, clear oral mucosa





- Neck


Neck: Present: supple, normal ROM





- Respiratory


Respiratory effort: normal


Respiratory: bilateral: CTA





- Cardiovascular


Heart Sounds: Present: S1 & S2.  Absent: rub, click





- Extremities


Extremities: pulses symmetrical, No edema


Peripheral Pulses: within normal limits





- Abdominal


General gastrointestinal: Present: soft, non-tender, non-distended, normal 

bowel sounds


Female genitourinary: Present: normal





- Integumentary


Integumentary: Present: clear, warm, dry





- Musculoskeletal


Musculoskeletal: strength equal bilaterally, left sided weakness





- Psychiatric


Psychiatric: no appropriate mood/affect, no intact judgment & insight, no 

memory intact





- Neurologic


Neurologic: decreased motor strenght left upper and lower ext 3/5. Oriented 

only to person








- Constitutional


Vitals: 


 











Temp Pulse Resp BP Pulse Ox


 


 98.2 F   76   20   180/86   96 


 


 08/24/18 05:31  08/24/18 07:43  08/24/18 07:43  08/24/18 07:43  08/24/18 07:43














Plan


Activity: advance as tolerated, fall precautions


Diet: low fat, low cholesterol


Special Instructions: record daily BP diary, record blood sugar diary


Follow up with: 


PRIMARY CARE,MD [Primary Care Provider] - 3-5 Days


THIAGO LUGO MD [Staff Physician] - 7 Days


Prescriptions: 


AtorvaSTATin [Lipitor] 40 mg PO QHS #30 tablet


Donepezil [Aricept] 5 mg PO QHS #30 tablet


Aspirin [Aspirin TAB] 325 mg PO QDAY #30 tablet


Bisacodyl [Dulcolax suppos] 10 mg [place] QDAY PRN #30 supp.rect


 PRN Reason: Constipation


metFORMIN [Glucophage] 500 mg PO BID #60 tablet

## 2018-08-25 RX ADMIN — METOPROLOL SUCCINATE SCH MG: 50 TABLET, EXTENDED RELEASE ORAL at 11:51

## 2018-08-25 RX ADMIN — DONEPEZIL HYDROCHLORIDE SCH MG: 5 TABLET, FILM COATED ORAL at 22:35

## 2018-08-25 RX ADMIN — AMLODIPINE BESYLATE SCH MG: 10 TABLET ORAL at 11:52

## 2018-08-25 RX ADMIN — ASPIRIN SCH MG: 325 TABLET ORAL at 11:52

## 2018-08-25 RX ADMIN — ACETAMINOPHEN PRN MG: 325 TABLET ORAL at 22:35

## 2018-08-25 RX ADMIN — ACETAMINOPHEN PRN MG: 325 TABLET ORAL at 12:21

## 2018-08-25 NOTE — PROGRESS NOTE
Assessment and Plan


Assessment and plan: 


68 YO Female with Obesity, HTN presents to ED for evaluation. Pt is unable to 

provide detailed history, but history provided by her daughter who is at 

bedside during exam and interview. As per daughter, the patient has experienced 

confusion, and Left sided weakness over the past 2 days with persistent 

symptoms over the last 1 day.  No reports of fever, chills, CP, Palpitations, 

NVD, Trauma, Falls, skin rash, productive cough, or recent ill contacts. Pt 

seen and evaluated in ED and found to have symptoms consistent with CVA and 

Encephalopathy. Pt is outside therapeutic window for TPA. Pt admitted to 

telemetry and initiated on CVA protocol. 





(1) CVA (cerebral vascular accident)


Current Visit: Yes   Status: Acute   


Qualifiers: 


   Precerebral and cerebral artery: middle cerebral artery   Laterality of 

affected vessel: right 


Plan to address problem: 


CT Head, MRI brain, MRA Brain-REVIEWED.


Subacute cva noted


EF 40-45%


Continue statin therapy, antiplatelet therapy, seizure precautions, neuro 

checks. 


pending placement.








(2) Left hemiparesis


Current Visit: Yes   Status: Acute   


Plan to address problem: 


PT consulted, supportive care. 








(3) HTN (hypertension)


Current Visit: Yes   Status: Acute   


Qualifiers: 


   Hypertension type: essential hypertension   Qualified Code(s): I10 - 

Essential (primary) hypertension   


Plan to address problem: 


Monitor BP q shift,


now controlled








(4) Encephalopathy


Current Visit: Yes   Status: Acute   


Plan to address problem: 


aS NOTED ABOVE, neuro checks, supportive care, thyroid panel


No further fall noted.


Pending placement


PT/OT








(5) DVT prophylaxis


Current Visit: Yes   Status: Acute   


Plan discussed with family





History


Interval history: 


Patient seen and examined, no other clinical changes reported.  Mental status 

mildly improved and orientation. family at bedside





Hospitalist Physical





- Physical exam


Narrative exam: 


General appearance: Present: mild distress





- EENT


ENT: hearing intact, clear oral mucosa





- Neck


Neck: Present: supple, normal ROM





- Respiratory


Respiratory effort: normal


Respiratory: bilateral: CTA





- Cardiovascular


Heart Sounds: Present: S1 & S2.  Absent: rub, click





- Extremities


Extremities: pulses symmetrical, No edema


Peripheral Pulses: within normal limits





- Abdominal


General gastrointestinal: Present: soft, non-tender, non-distended, normal 

bowel sounds


Female genitourinary: Present: normal





- Integumentary


Integumentary: Present: clear, warm, dry





- Musculoskeletal


Musculoskeletal: strength equal bilaterally, left sided weakness





- Psychiatric


Psychiatric: no appropriate mood/affect, no intact judgment & insight, no 

memory intact





- Neurologic


Neurologic: decreased motor strenght left upper and lower ext 3/5. Oriented 

only to person and place








- Constitutional


Vitals: 


 











Temp Pulse Resp BP Pulse Ox


 


 98.5 F   66   18   178/91   96 


 


 08/25/18 11:21  08/25/18 11:51  08/25/18 11:21  08/25/18 11:51  08/25/18 11:21











General appearance: Present: mild distress





Results





- Labs


CBC & Chem 7: 


 08/22/18 09:56





 08/22/18 09:56


Labs: 


 Laboratory Last Values











WBC  8.3 K/mm3 (4.5-11.0)   08/22/18  09:56    


 


RBC  5.79 M/mm3 (3.65-5.03)  H  08/22/18  09:56    


 


Hgb  15.5 gm/dl (10.1-14.3)  H  08/22/18  09:56    


 


Hct  46.6 % (30.3-42.9)  H  08/22/18  09:56    


 


MCV  80 fl (79-97)   08/22/18  09:56    


 


MCH  27 pg (28-32)  L  08/22/18  09:56    


 


MCHC  33 % (30-34)   08/22/18  09:56    


 


RDW  15.3 % (13.2-15.2)  H  08/22/18  09:56    


 


Plt Count  224 K/mm3 (140-440)   08/22/18  09:56    


 


Lymph % (Auto)  28.9 % (13.4-35.0)   08/22/18  09:56    


 


Mono % (Auto)  4.6 % (0.0-7.3)   08/22/18  09:56    


 


Eos % (Auto)  1.2 % (0.0-4.3)   08/22/18  09:56    


 


Baso % (Auto)  0.4 % (0.0-1.8)   08/22/18  09:56    


 


Lymph #  2.4 K/mm3 (1.2-5.4)   08/22/18  09:56    


 


Mono #  0.4 K/mm3 (0.0-0.8)   08/22/18  09:56    


 


Eos #  0.1 K/mm3 (0.0-0.4)   08/22/18  09:56    


 


Baso #  0.0 K/mm3 (0.0-0.1)   08/22/18  09:56    


 


Seg Neutrophils %  64.9 % (40.0-70.0)   08/22/18  09:56    


 


Seg Neutrophils #  5.4 K/mm3 (1.8-7.7)   08/22/18  09:56    


 


PT  15.0 Sec. (12.2-14.9)  H  08/22/18  09:56    


 


INR  1.12  (0.87-1.13)   08/22/18  09:56    


 


APTT  26.9 Sec. (24.2-36.6)   08/22/18  09:56    


 


Thrombin Time  17.0 Sec. (15.1-19.6)   08/22/18  09:56    


 


Sodium  139 mmol/L (137-145)   08/22/18  09:56    


 


Potassium  4.6 mmol/L (3.6-5.0)   08/22/18  09:56    


 


Chloride  101.5 mmol/L ()   08/22/18  09:56    


 


Carbon Dioxide  20 mmol/L (22-30)  L  08/22/18  09:56    


 


Anion Gap  22 mmol/L  08/22/18  09:56    


 


BUN  10 mg/dL (7-17)   08/22/18  09:56    


 


Creatinine  0.6 mg/dL (0.7-1.2)  L  08/22/18  09:56    


 


Estimated GFR  > 60 ml/min  08/22/18  09:56    


 


BUN/Creatinine Ratio  17 %  08/22/18  09:56    


 


Glucose  260 mg/dL ()  H  08/22/18  09:56    


 


POC Glucose  149  ()  H  08/24/18  16:06    


 


Hemoglobin A1c  6.5 % (4-6)  H  08/23/18  19:12    


 


Calcium  10.1 mg/dL (8.4-10.2)   08/22/18  09:56    


 


Magnesium  2.00 mg/dL (1.7-2.3)   08/22/18  09:56    


 


Ammonia  28.0 umol/L (25-60)   08/23/18  19:04    


 


Total Creatine Kinase  49 units/L ()   08/22/18  09:56    


 


Troponin T  < 0.010 ng/mL (0.00-0.029)   08/22/18  09:56    


 


Triglycerides  156 mg/dL (2-149)  H  08/23/18  05:11    


 


Cholesterol  244 mg/dL ()  H  08/23/18  05:11    


 


LDL Cholesterol Direct  192 mg/dL ()  H  08/23/18  05:11    


 


HDL Cholesterol  38 mg/dL (40-59)  L  08/23/18  05:11    


 


Cholesterol/HDL Ratio  6.42 %  08/23/18  05:11    


 


Vitamin B12  1095 pg/mL (211-911)  H  08/23/18  19:12    


 


TSH  1.760 mlU/mL (0.270-4.200)   08/22/18  14:44    


 


Free T4  1.28 ng/dL (0.76-1.46)   08/22/18  14:44    


 


Urine Color  Yellow  (Yellow)   08/22/18  11:41    


 


Urine Turbidity  Slightly-cloudy  (Clear)   08/22/18  11:41    


 


Urine pH  5.0  (5.0-7.0)   08/22/18  11:41    


 


Ur Specific Gravity  1.018  (1.003-1.030)   08/22/18  11:41    


 


Urine Protein  <15 mg/dl mg/dL (Negative)   08/22/18  11:41    


 


Urine Glucose (UA)  >=500 mg/dL (Negative)   08/22/18  11:41    


 


Urine Ketones  Tr mg/dL (Negative)   08/22/18  11:41    


 


Urine Blood  Neg  (Negative)   08/22/18  11:41    


 


Urine Nitrite  Neg  (Negative)   08/22/18  11:41    


 


Urine Bilirubin  Neg  (Negative)   08/22/18  11:41    


 


Urine Urobilinogen  < 2.0 mg/dL (<2.0)   08/22/18  11:41    


 


Ur Leukocyte Esterase  Tr  (Negative)   08/22/18  11:41    


 


Urine WBC (Auto)  2.0 /HPF (0.0-6.0)   08/22/18  11:41    


 


Urine RBC (Auto)  2.0 /HPF (0.0-6.0)   08/22/18  11:41    


 


U Epithel Cells (Auto)  4.0 /HPF (0-13.0)   08/22/18  11:41    


 


Hyaline Casts  1 /LPF  08/22/18  11:41    


 


Urine Mucus  Few /HPF  08/22/18  11:41

## 2018-08-26 RX ADMIN — ACETAMINOPHEN PRN MG: 325 TABLET ORAL at 09:45

## 2018-08-26 RX ADMIN — ASPIRIN SCH MG: 325 TABLET ORAL at 09:44

## 2018-08-26 RX ADMIN — ACETAMINOPHEN PRN MG: 325 TABLET ORAL at 23:33

## 2018-08-26 RX ADMIN — AMLODIPINE BESYLATE SCH MG: 10 TABLET ORAL at 09:44

## 2018-08-26 RX ADMIN — DONEPEZIL HYDROCHLORIDE SCH MG: 5 TABLET, FILM COATED ORAL at 23:33

## 2018-08-26 RX ADMIN — METOPROLOL SUCCINATE SCH MG: 50 TABLET, EXTENDED RELEASE ORAL at 09:43

## 2018-08-26 NOTE — PROGRESS NOTE
Assessment and Plan


Assessment and plan: 


68 YO Female with Obesity, HTN presents to ED for evaluation. Pt is unable to 

provide detailed history, but history provided by her daughter who is at 

bedside during exam and interview. As per daughter, the patient has experienced 

confusion, and Left sided weakness over the past 2 days with persistent 

symptoms over the last 1 day.  No reports of fever, chills, CP, Palpitations, 

NVD, Trauma, Falls, skin rash, productive cough, or recent ill contacts. Pt 

seen and evaluated in ED and found to have symptoms consistent with CVA and 

Encephalopathy. Pt is outside therapeutic window for TPA. Pt admitted to 

telemetry and initiated on CVA protocol. 





(1) CVA (cerebral vascular accident)


Current Visit: Yes   Status: Acute   


Qualifiers: 


   Precerebral and cerebral artery: middle cerebral artery   Laterality of 

affected vessel: right 


Plan to address problem: 


CT Head, MRI brain, MRA Brain-REVIEWED.


Subacute cva noted


EF 40-45%


Continue statin therapy, antiplatelet therapy, seizure precautions,


pending placement.








(2) Left hemiparesis


Current Visit: Yes   Status: Acute   


Plan to address problem: 


PT consulted, supportive care. 








(3) HTN (hypertension)


Current Visit: Yes   Status: Acute   


Qualifiers: 


   Hypertension type: essential hypertension   Qualified Code(s): I10 - 

Essential (primary) hypertension   


Plan to address problem: 


Monitor BP q shift,


now controlled








(4) Encephalopathy


Current Visit: Yes   Status: Acute   


Plan to address problem: 


aS NOTED ABOVE, neuro checks, supportive care, thyroid panel


No further fall noted.


Pending placement


PT/OT








(5) DVT prophylaxis


Current Visit: Yes   Status: Acute   


Plan discussed with family





History


Interval history: 


Patient seen and examined, no other clinical changes reported. family at bedside





Hospitalist Physical





- Physical exam


Narrative exam: 


General appearance: Present: mild distress





- EENT


ENT: hearing intact, clear oral mucosa





- Neck


Neck: Present: supple, normal ROM





- Respiratory


Respiratory effort: normal


Respiratory: bilateral: CTA





- Cardiovascular


Heart Sounds: Present: S1 & S2.  Absent: rub, click





- Extremities


Extremities: pulses symmetrical, No edema


Peripheral Pulses: within normal limits





- Abdominal


General gastrointestinal: Present: soft, non-tender, non-distended, normal 

bowel sounds


Female genitourinary: Present: normal





- Integumentary


Integumentary: Present: clear, warm, dry





- Musculoskeletal


Musculoskeletal: strength equal bilaterally, left sided weakness





- Psychiatric


Psychiatric: no appropriate mood/affect, no intact judgment & insight, no 

memory intact





- Neurologic


Neurologic: decreased motor strenght left upper and lower ext 3/5. Oriented 

only to person and place








- Constitutional


Vitals: 


 











Temp Pulse Resp BP Pulse Ox


 


 98.1 F   70   20   168/82   96 


 


 08/26/18 06:24  08/26/18 09:43  08/26/18 06:24  08/26/18 09:43  08/26/18 06:24











General appearance: Present: mild distress





Results





- Labs


CBC & Chem 7: 


 08/22/18 09:56





 08/22/18 09:56


Labs: 


 Laboratory Last Values











WBC  8.3 K/mm3 (4.5-11.0)   08/22/18  09:56    


 


RBC  5.79 M/mm3 (3.65-5.03)  H  08/22/18  09:56    


 


Hgb  15.5 gm/dl (10.1-14.3)  H  08/22/18  09:56    


 


Hct  46.6 % (30.3-42.9)  H  08/22/18  09:56    


 


MCV  80 fl (79-97)   08/22/18  09:56    


 


MCH  27 pg (28-32)  L  08/22/18  09:56    


 


MCHC  33 % (30-34)   08/22/18  09:56    


 


RDW  15.3 % (13.2-15.2)  H  08/22/18  09:56    


 


Plt Count  224 K/mm3 (140-440)   08/22/18  09:56    


 


Lymph % (Auto)  28.9 % (13.4-35.0)   08/22/18  09:56    


 


Mono % (Auto)  4.6 % (0.0-7.3)   08/22/18  09:56    


 


Eos % (Auto)  1.2 % (0.0-4.3)   08/22/18  09:56    


 


Baso % (Auto)  0.4 % (0.0-1.8)   08/22/18  09:56    


 


Lymph #  2.4 K/mm3 (1.2-5.4)   08/22/18  09:56    


 


Mono #  0.4 K/mm3 (0.0-0.8)   08/22/18  09:56    


 


Eos #  0.1 K/mm3 (0.0-0.4)   08/22/18  09:56    


 


Baso #  0.0 K/mm3 (0.0-0.1)   08/22/18  09:56    


 


Seg Neutrophils %  64.9 % (40.0-70.0)   08/22/18  09:56    


 


Seg Neutrophils #  5.4 K/mm3 (1.8-7.7)   08/22/18  09:56    


 


PT  15.0 Sec. (12.2-14.9)  H  08/22/18  09:56    


 


INR  1.12  (0.87-1.13)   08/22/18  09:56    


 


APTT  26.9 Sec. (24.2-36.6)   08/22/18  09:56    


 


Thrombin Time  17.0 Sec. (15.1-19.6)   08/22/18  09:56    


 


Sodium  139 mmol/L (137-145)   08/22/18  09:56    


 


Potassium  4.6 mmol/L (3.6-5.0)   08/22/18  09:56    


 


Chloride  101.5 mmol/L ()   08/22/18  09:56    


 


Carbon Dioxide  20 mmol/L (22-30)  L  08/22/18  09:56    


 


Anion Gap  22 mmol/L  08/22/18  09:56    


 


BUN  10 mg/dL (7-17)   08/22/18  09:56    


 


Creatinine  0.6 mg/dL (0.7-1.2)  L  08/22/18  09:56    


 


Estimated GFR  > 60 ml/min  08/22/18  09:56    


 


BUN/Creatinine Ratio  17 %  08/22/18  09:56    


 


Glucose  260 mg/dL ()  H  08/22/18  09:56    


 


POC Glucose  149  ()  H  08/24/18  16:06    


 


Hemoglobin A1c  6.5 % (4-6)  H  08/23/18  19:12    


 


Calcium  10.1 mg/dL (8.4-10.2)   08/22/18  09:56    


 


Magnesium  2.00 mg/dL (1.7-2.3)   08/22/18  09:56    


 


Ammonia  28.0 umol/L (25-60)   08/23/18  19:04    


 


Total Creatine Kinase  49 units/L ()   08/22/18  09:56    


 


Troponin T  < 0.010 ng/mL (0.00-0.029)   08/22/18  09:56    


 


Triglycerides  156 mg/dL (2-149)  H  08/23/18  05:11    


 


Cholesterol  244 mg/dL ()  H  08/23/18  05:11    


 


LDL Cholesterol Direct  192 mg/dL ()  H  08/23/18  05:11    


 


HDL Cholesterol  38 mg/dL (40-59)  L  08/23/18  05:11    


 


Cholesterol/HDL Ratio  6.42 %  08/23/18  05:11    


 


Vitamin B12  1095 pg/mL (211-911)  H  08/23/18  19:12    


 


RBC Folic Acid  481 ng/mL (>280)   08/23/18  19:04    


 


TSH  1.760 mlU/mL (0.270-4.200)   08/22/18  14:44    


 


Free T4  1.28 ng/dL (0.76-1.46)   08/22/18  14:44    


 


Urine Color  Yellow  (Yellow)   08/22/18  11:41    


 


Urine Turbidity  Slightly-cloudy  (Clear)   08/22/18  11:41    


 


Urine pH  5.0  (5.0-7.0)   08/22/18  11:41    


 


Ur Specific Gravity  1.018  (1.003-1.030)   08/22/18  11:41    


 


Urine Protein  <15 mg/dl mg/dL (Negative)   08/22/18  11:41    


 


Urine Glucose (UA)  >=500 mg/dL (Negative)   08/22/18  11:41    


 


Urine Ketones  Tr mg/dL (Negative)   08/22/18  11:41    


 


Urine Blood  Neg  (Negative)   08/22/18  11:41    


 


Urine Nitrite  Neg  (Negative)   08/22/18  11:41    


 


Urine Bilirubin  Neg  (Negative)   08/22/18  11:41    


 


Urine Urobilinogen  < 2.0 mg/dL (<2.0)   08/22/18  11:41    


 


Ur Leukocyte Esterase  Tr  (Negative)   08/22/18  11:41    


 


Urine WBC (Auto)  2.0 /HPF (0.0-6.0)   08/22/18  11:41    


 


Urine RBC (Auto)  2.0 /HPF (0.0-6.0)   08/22/18  11:41    


 


U Epithel Cells (Auto)  4.0 /HPF (0-13.0)   08/22/18  11:41    


 


Hyaline Casts  1 /LPF  08/22/18  11:41    


 


Urine Mucus  Few /HPF  08/22/18  11:41

## 2018-08-27 VITALS — DIASTOLIC BLOOD PRESSURE: 87 MMHG | SYSTOLIC BLOOD PRESSURE: 147 MMHG

## 2018-08-27 RX ADMIN — ASPIRIN SCH MG: 325 TABLET ORAL at 11:28

## 2018-08-27 RX ADMIN — AMLODIPINE BESYLATE SCH MG: 10 TABLET ORAL at 11:29

## 2018-08-27 RX ADMIN — METOPROLOL SUCCINATE SCH MG: 50 TABLET, EXTENDED RELEASE ORAL at 11:29

## 2018-08-27 RX ADMIN — ACETAMINOPHEN PRN MG: 325 TABLET ORAL at 11:29

## 2018-08-28 NOTE — VASCULAR LAB REPORT
CAROTID DUPLEX STUDY:



RIGHT        PSVEDV

CCA PROX:[id number]

CCA DIST:[id number]

ICA PROX:[id number]

ICA MID:[id number]

ICA DIST:[id number]

ECA:               [id number]

VERT:                  37       6  



LEFT         PSVEDV

CCA PROX:[id number]

CCA DIST:[id number]

ICA PROX:[id number]

ICA MID:[id number]

ICA DIST:[id number]

ECA:                    [id number]

VERT:                  42       11





REASON FOR EXAM: Stroke.

     

COMMENTS ON THE RIGHT:

Doppler frequency analysis is consistent with 16 to 49 percent diameter 

reduction of the internal carotid artery.  A small amount of plaque is 

seen.  The common carotid artery is patent. The external carotid artery 

is patent.  The vertebral artery has antegrade flow.



COMMENTS ON THE LEFT:

Doppler frequency analysis is consistent with 16 to 49 percent diameter 

reduction of the internal carotid artery.  A small amount of plaque is 

seen.  The common carotid artery is patent. The external carotid artery 

is patent.  The vertebral artery has antegrade flow.



IMPRESSION:

Less than 50% diameter reduction in the internal carotid arteries 

bilaterally.

## 2019-04-27 ENCOUNTER — HOSPITAL ENCOUNTER (EMERGENCY)
Dept: HOSPITAL 5 - ED | Age: 69
LOS: 1 days | Discharge: HOME | End: 2019-04-28
Payer: MEDICARE

## 2019-04-27 DIAGNOSIS — R41.82: ICD-10-CM

## 2019-04-27 DIAGNOSIS — E86.0: ICD-10-CM

## 2019-04-27 DIAGNOSIS — R41.0: ICD-10-CM

## 2019-04-27 DIAGNOSIS — K52.9: Primary | ICD-10-CM

## 2019-04-27 DIAGNOSIS — K80.20: ICD-10-CM

## 2019-04-27 LAB
ALBUMIN SERPL-MCNC: 3.8 G/DL (ref 3.9–5)
ALT SERPL-CCNC: 7 UNITS/L (ref 7–56)
BASOPHILS # (AUTO): 0.1 K/MM3 (ref 0–0.1)
BASOPHILS NFR BLD AUTO: 0.6 % (ref 0–1.8)
BUN SERPL-MCNC: 23 MG/DL (ref 7–17)
BUN/CREAT SERPL: 21 %
CALCIUM SERPL-MCNC: 10.6 MG/DL (ref 8.4–10.2)
EOSINOPHIL # BLD AUTO: 0.4 K/MM3 (ref 0–0.4)
EOSINOPHIL NFR BLD AUTO: 4.2 % (ref 0–4.3)
HCT VFR BLD CALC: 41.3 % (ref 30.3–42.9)
HEMOLYSIS INDEX: 33
HGB BLD-MCNC: 13.6 GM/DL (ref 10.1–14.3)
LYMPHOCYTES # BLD AUTO: 2.7 K/MM3 (ref 1.2–5.4)
LYMPHOCYTES NFR BLD AUTO: 26.6 % (ref 13.4–35)
MCHC RBC AUTO-ENTMCNC: 33 % (ref 30–34)
MCV RBC AUTO: 81 FL (ref 79–97)
MONOCYTES # (AUTO): 0.7 K/MM3 (ref 0–0.8)
MONOCYTES % (AUTO): 7.3 % (ref 0–7.3)
PLATELET # BLD: 296 K/MM3 (ref 140–440)
RBC # BLD AUTO: 5.12 M/MM3 (ref 3.65–5.03)

## 2019-04-27 PROCEDURE — 76705 ECHO EXAM OF ABDOMEN: CPT

## 2019-04-27 PROCEDURE — 93005 ELECTROCARDIOGRAM TRACING: CPT

## 2019-04-27 PROCEDURE — 81001 URINALYSIS AUTO W/SCOPE: CPT

## 2019-04-27 PROCEDURE — 96361 HYDRATE IV INFUSION ADD-ON: CPT

## 2019-04-27 PROCEDURE — 93010 ELECTROCARDIOGRAM REPORT: CPT

## 2019-04-27 PROCEDURE — 83690 ASSAY OF LIPASE: CPT

## 2019-04-27 PROCEDURE — 70450 CT HEAD/BRAIN W/O DYE: CPT

## 2019-04-27 PROCEDURE — 80053 COMPREHEN METABOLIC PANEL: CPT

## 2019-04-27 PROCEDURE — 82962 GLUCOSE BLOOD TEST: CPT

## 2019-04-27 PROCEDURE — 85025 COMPLETE CBC W/AUTO DIFF WBC: CPT

## 2019-04-27 PROCEDURE — 36415 COLL VENOUS BLD VENIPUNCTURE: CPT

## 2019-04-27 PROCEDURE — 74176 CT ABD & PELVIS W/O CONTRAST: CPT

## 2019-04-27 PROCEDURE — 99284 EMERGENCY DEPT VISIT MOD MDM: CPT

## 2019-04-27 PROCEDURE — 96360 HYDRATION IV INFUSION INIT: CPT

## 2019-04-27 NOTE — EMERGENCY DEPARTMENT REPORT
ED Abdominal Pain HPI





- General


Chief Complaint: Abdominal Pain


Stated Complaint: FALL/STOMACH PAIN/VOMITTING


Time Seen by Provider: 04/27/19 20:42


Source: patient, family


Mode of arrival: Wheelchair


Limitations: Altered Mental Status





- History of Present Illness


Initial Comments: 





Patient is a 68-year-old female presents emergency room with complaints of 

abdominal pain, nausea and vomiting, altered mental status and confusion.  

Patient family that bedside.  Patient at this time is A& O 1 and family states 

that is her baseline but family family states that patient is more confused than

usual.  Patient at this time denies complaints however the family states that 

the patient was complaining of severe epigastric abdominal pain and nausea and 

vomiting for 3 days.  Family denies fever or chills.  Denies other complaints.  

Patient has not been able to eat or drink for 3 days.


MD Complaint: abdominal pain


-: Sudden


Location: epigastric


Radiation: none


Migration to: no migration


Severity: severe


Quality: stabbing


Consistency: constant


Improves With: rest


Worsens With: eating, vomiting, movement


Associated Symptoms: nausea, vomiting, anorexia.  denies: diarrhea, fever, 

chills, constipation, dysuria, hematemesis, hematochezia, melena, hematuria, 

syncope





- Related Data


LMP (females 10-50): unknown


                                Home Medications











 Medication  Instructions  Recorded  Confirmed  Last Taken


 


Amlodipine Besylate [Norvasc] 10 mg PO QDAY 08/22/18 08/22/18 Unknown


 


Fosinopril Sodium 40 mg PO QDAY 08/22/18 08/22/18 Unknown


 


Metoprolol Succinate [Toprol Xl] 50 mg PO QDAY 08/22/18 08/22/18 Unknown








                                  Previous Rx's











 Medication  Instructions  Recorded  Last Taken  Type


 


Aspirin [Aspirin TAB] 325 mg PO QDAY #30 tablet 08/24/18 Unknown Rx


 


AtorvaSTATin [Lipitor] 40 mg PO QHS #30 tablet 08/24/18 Unknown Rx


 


Bisacodyl [Dulcolax suppos] 10 mg ND QDAY PRN #30 supp.rect 08/24/18 Unknown Rx


 


Donepezil [Aricept] 5 mg PO QHS #30 tablet 08/24/18 Unknown Rx


 


metFORMIN [Glucophage] 500 mg PO BID #60 tablet 08/24/18 Unknown Rx


 


Ondansetron [Zofran Odt] 4 mg PO Q8HR PRN #12 tab.rapdis 04/28/19 Unknown Rx











                                    Allergies











Allergy/AdvReac Type Severity Reaction Status Date / Time


 


No Known Allergies Allergy   Verified 04/27/19 18:40














ED Review of Systems


ROS: 


Stated complaint: FALL/STOMACH PAIN/VOMITTING


Other details as noted in HPI





Comment: Unobtainable due to pts medical conditions


Constitutional: denies: chills, fever


Eyes: denies: eye pain, eye discharge, vision change


ENT: denies: ear pain, throat pain


Respiratory: denies: cough, shortness of breath, wheezing


Cardiovascular: denies: chest pain, palpitations


Endocrine: no symptoms reported


Gastrointestinal: abdominal pain, nausea, vomiting.  denies: diarrhea


Genitourinary: denies: urgency, dysuria, discharge


Musculoskeletal: denies: back pain, joint swelling, arthralgia


Skin: denies: rash, lesions


Neurological: confusion.  denies: headache, weakness, paresthesias


Psychiatric: denies: anxiety, depression


Hematological/Lymphatic: denies: easy bleeding, easy bruising





ED Past Medical Hx





- Past Medical History


Previous Medical History?: Yes


Hx Hypertension: Yes


Hx Diabetes: Yes


Hx HIV: No


Additional medical history: dementia, high cholesterol.





- Surgical History


Past Surgical History?: Yes


Additional Surgical History: abdominal surgery.  Vaginal pessary





- Family History


Family history: no significant





- Social History


Smoking Status: Never Smoker


Substance Use Type: None





- Medications


Home Medications: 


                                Home Medications











 Medication  Instructions  Recorded  Confirmed  Last Taken  Type


 


Amlodipine Besylate [Norvasc] 10 mg PO QDAY 08/22/18 08/22/18 Unknown History


 


Fosinopril Sodium 40 mg PO QDAY 08/22/18 08/22/18 Unknown History


 


Metoprolol Succinate [Toprol Xl] 50 mg PO QDAY 08/22/18 08/22/18 Unknown History


 


Aspirin [Aspirin TAB] 325 mg PO QDAY #30 tablet 08/24/18  Unknown Rx


 


AtorvaSTATin [Lipitor] 40 mg PO QHS #30 tablet 08/24/18  Unknown Rx


 


Bisacodyl [Dulcolax suppos] 10 mg ND QDAY PRN #30 supp.rect 08/24/18  Unknown Rx


 


Donepezil [Aricept] 5 mg PO QHS #30 tablet 08/24/18  Unknown Rx


 


metFORMIN [Glucophage] 500 mg PO BID #60 tablet 08/24/18  Unknown Rx


 


Ondansetron [Zofran Odt] 4 mg PO Q8HR PRN #12 tab.rapdis 04/28/19  Unknown Rx














ED Physical Exam





- General


Limitations: Altered Mental Status


General appearance: alert, in no apparent distress





- Head


Head exam: Present: atraumatic, normocephalic





- Eye


Eye exam: Present: normal appearance, PERRL


Pupils: Present: normal accommodation





- ENT


ENT exam: Present: mucous membranes moist





- Neck


Neck exam: Present: normal inspection





- Respiratory


Respiratory exam: Present: normal lung sounds bilaterally.  Absent: respiratory 

distress





- Cardiovascular


Cardiovascular Exam: Present: regular rate, normal rhythm.  Absent: systolic 

murmur, diastolic murmur, rubs, gallop





- GI/Abdominal


GI/Abdominal exam: Present: soft, tenderness (right upper quadrant tenderness), 

normal bowel sounds.  Absent: distended, guarding, rebound





- Extremities Exam


Extremities exam: Present: normal inspection





- Back Exam


Back exam: Present: normal inspection





- Neurological Exam


Neurological exam: Present: alert, altered





- Psychiatric


Psychiatric exam: Present: normal affect, normal mood





- Skin


Skin exam: Present: warm, dry, intact, normal color.  Absent: rash





ED Course


                                   Vital Signs











  04/27/19 04/27/19 04/27/19





  18:47 20:42 20:45


 


Temperature 98.1 F  


 


Pulse Rate 86 78 72


 


Respiratory 18 19 11 L





Rate   


 


Blood Pressure 149/98  154/84


 


O2 Sat by Pulse 97 99 100





Oximetry   














  04/27/19 04/27/19 04/27/19





  21:00 21:15 21:30


 


Temperature   


 


Pulse Rate 65 67 63


 


Respiratory 14 17 13





Rate   


 


Blood Pressure 116/80 116/80 125/73


 


O2 Sat by Pulse  98 100





Oximetry   














  04/27/19 04/27/19 04/27/19





  21:35 22:01 22:31


 


Temperature   


 


Pulse Rate 63 74 


 


Respiratory 10 L 14 





Rate   


 


Blood Pressure 125/73 120/74 115/76


 


O2 Sat by Pulse 99 100 99





Oximetry   














  04/27/19 04/27/19 04/27/19





  22:50 23:00 23:31


 


Temperature   


 


Pulse Rate  65 78


 


Respiratory 18 19 10 L





Rate   


 


Blood Pressure  140/71 172/108


 


O2 Sat by Pulse 98 99 98





Oximetry   














  04/28/19 04/28/19 04/28/19





  00:00 00:31 01:03


 


Temperature   


 


Pulse Rate 74 63 70


 


Respiratory 17 16 11 L





Rate   


 


Blood Pressure 148/81 140/64 140/64


 


O2 Sat by Pulse 99 99 





Oximetry   














  04/28/19 04/28/19 04/28/19





  01:31 02:01 02:31


 


Temperature   


 


Pulse Rate 73 60 67


 


Respiratory 15 15 15





Rate   


 


Blood Pressure 140/64 135/78 125/74


 


O2 Sat by Pulse 98 98 98





Oximetry   














  04/28/19 04/28/19 04/28/19





  03:00 03:31 04:03


 


Temperature   98.0 F


 


Pulse Rate 64 63 


 


Respiratory 12 13 





Rate   


 


Blood Pressure 139/72 138/66 


 


O2 Sat by Pulse 98 97 





Oximetry   














- Reevaluation(s)


Reevaluation #1: 


discussed abdominal CT results with family.  Patient will have an ultrasound and

a head CT.


04/27/19 23:17





Discussed all results with family.  Discussed discharge with family.  Patient 

has not had any nausea or pain while in the ER.  Patient is stable for 

discharge.  Patient will be discharged home.  Family given discharge 

instructions.  Family voiced understanding of discharge instructions.


04/28/19 02:23














ED Medical Decision Making





- Lab Data


Result diagrams: 


                                 04/27/19 19:13





                                 04/27/19 19:13





- EKG Data


-: EKG Interpreted by Me


EKG shows normal: sinus rhythm, axis, intervals, QRS complexes, ST-T waves


Rate: normal





- Radiology Data


Radiology results: report reviewed


PROCEDURE: CT ABDOMEN PELVIS WO CON 





 TECHNIQUE: Computerized axial tomography of the abdomen and pelvis was 

performed without 


 intravenous contrast. This study is performed without intravascular contrast 

material and its 


 sensitivity for abdominal and pelvic pathology, including neoplasms, 

inflammation, abscess, free 


 fluid, thrombosis, arterial dissection and infarction, is reduced compared with

a contrast enhanced 


 study. 





 CT DOSE LENGTH PRODUCT: 1095.6 mGycm 





 HISTORY: abd pain. n/v 





 COMPARISONS: None . 





 FINDINGS: 





 There is mild degree cardiomegaly. Liver, spleen, pancreas and adrenal glands 

are within normal 


 limits. Bilateral kidneys demonstrate normal density without calculi or 

hydronephrosis. Urinary 


 bladder is partially filled with normal outlines. Aorta is of normal caliber. 

There is no free fluid


 or free air. Gallbladder contains multiple calculi measuring up to 2.6 cm. 

Small bowel loops are 


 within normal limits. Moderate degree of residual stool is noted. Appendix is 

not distinctly 


 visualized. There are no inflammatory changes in the right lower quadrant. A 

radiopaque device is 


 noted in the vagina measuring 6.2 x 5.0 cm which. A 6.9 x 5.6 cm soft tissue 

density lesion is noted


 anterior to the spleen and lateral to the gastric fundus. Moderate degree of 

degenerative changes 


 are noted involving the lumbar spine. Vertebral height is normal. 





 IMPRESSION: Cholelithiasis without any CT evidence of acute cholecystitis 


 A soft tissue density mass lesion in the left upper quadrant may represent a 

mass versus an 


 accessory spleen. Postcontrast CT is recommended for further evaluation. 


 A Radiopaque device is identified in the vagina. This may represent a prost

hetic device versus a 


 foreign body. Clinical correlation is recommended. 





























 PROCEDURE: US ABDOMEN LIMITED 





 TECHNIQUE: Routine imaging was obtained of the right upper quadrant. 





 HISTORY: abd pain. cholithiasis 





 COMPARISONS: CT scan abdomen and pelvis 4/27/2019 





 FINDINGS: 





 The gallbladder is normal size and contains numerous stones. There are no 

secondary signs of acute 


 cholecystitis. The common bile duct measures 4.9 mm which is normal. The liver 

is normal in size and


 echotexture. The abdominal aorta proximally measures 1.6 cm in diameter. The 

pancreatic head and 


 body appears normal. The tail is not well seen. The right kidney is remarkable 

for a prominent renal


 pelvis. It measures 11.0 x 3.1 x 6.4 cm. 





 IMPRESSION: 





 Gallstones. No secondary signs of acute cholecystitis. 


 Normal-appearing liver and biliary tree. 


 Prominent right extrarenal pelvis of the right kidney. 





























PROCEDURE: CT HEAD/BRAIN WO CON 





 TECHNIQUE: Computerized tomography of the head was performed without contrast 

material. 





 CT DOSE LENGTH PRODUCT: 1107.3 mGycm 





 HISTORY: ams 





 COMPARISONS: August 22, 2018 . 





 FINDINGS: 





 Skull and scalp: Normal . 


 Paranasal sinuses: Normal . 


 Ventricles and subarachnoid spaces: Normal . 


 Cerebrum: No evidence of hemorrhage, acute infarction or mass. Mild atrophy is 

noted. Slight 


 periventricular deep white matter change . 


 Cerebellum and brainstem: There is no evidence of acute hemorrhage or hematoma.

Old lacunar 


 infarction of the right roly region of the brainstem. . 


 Vasculature: Normal . 


 Other: None . 


 ASPECTS: 10 





 IMPRESSION: There is no evidence of an acute intracranial process. Mild atrophy

and slight 


 periventricular deep white matter changes. Old lacunar infarction of the right 

roly.

















- Medical Decision Making





Patient is a 68-year-old female that presented to emergency room with complaints

of nausea vomiting and abdominal pain and confusion.  Patient is stable 

throughout her ER stay.  Patient given fluids.  Patient states she feels good.  

Patient's family will take patient home.  Family agrees with discharge.  Family 

given discharge instructions.  Abdominal CT done and show cholelithiasis.  

Ultrasound done for cholelithiasis and shows stable go what her stones.  Patient

will need to follow up with a general surgery for further management.  Patient 

will follow-up with her primary care.  Patient given a liter of fluids and urine

is normal.  Labs essentially unremarkable.  Head CT shows no acute findings.





- Differential Diagnosis


dehydration.  Nausea vomiting.  Abdominal pain. ams


Critical care attestation.: 


If time is entered above; I have spent that time in minutes in the direct care 

of this critically ill patient, excluding procedure time.








ED Disposition


Clinical Impression: 


 Confusion, Dehydration, Gastroenteritis





Altered mental state


Qualifiers:


 Altered mental status type: unspecified Qualified Code(s): R41.82 - Altered 

mental status, unspecified





Abdominal pain


Qualifiers:


 Abdominal location: epigastric Qualified Code(s): R10.13 - Epigastric pain





Nausea & vomiting


Qualifiers:


 Vomiting type: unspecified Vomiting Intractability: non-intractable Qualified 

Code(s): R11.2 - Nausea with vomiting, unspecified





Cholelithiasis


Qualifiers:


 Cholelithiasis location: gallbladder Cholecystitis presence: without 

cholecystitis Biliary obstruction: without biliary obstruction Qualified 

Code(s): K80.20 - Calculus of gallbladder without cholecystitis without 

obstruction





Disposition: DC-01 TO HOME OR SELFCARE


Is pt being admited?: No


Does the pt Need Aspirin: No


Condition: Stable


Instructions:  Biliary Colic (ED), Cholelithiasis (ED), Low Fat Diet (ED), Heart

Healthy Diet (ED), Acute Nausea and Vomiting (ED), Abdominal Pain (ED)


Additional Instructions: 


Patient to follow-up with primary care in 2-3 days.  Patient to take Tylenol or 

ibuprofen when necessary for pain.  Patient to return to ER if condition 

worsens.  Patient to take meds as directed.  Patient to increase water.  Patient

to rest.  Patient to eat  a BRAT diet.  Patient to continue all meds.  Patient 

to follow up with general surgery in 2-3 days.


Prescriptions: 


Ondansetron [Zofran Odt] 4 mg PO Q8HR PRN #12 tab.rapdis


 PRN Reason: Nausea And Vomiting


Referrals: 


PRIMARY CARE,MD [Primary Care Provider] - 2-3 Days


MARINA ROJAS DO [Staff Physician] - 2-3 Days


Time of Disposition: 02:30

## 2019-04-27 NOTE — CAT SCAN REPORT
PROCEDURE: CT ABDOMEN PELVIS WO CON 

 

TECHNIQUE:  Computerized axial tomography of the abdomen and pelvis was performed without intravenous
 contrast. This study is performed without intravascular contrast material and its sensitivity for ab
dominal and pelvic pathology, including neoplasms, inflammation, abscess, free fluid, thrombosis, art
erial dissection and infarction, is reduced compared with a contrast enhanced study.  

 

CT DOSE LENGTH PRODUCT:  1095.6  mGycm 

  

HISTORY: abd pain. n/v 

 

COMPARISONS:  None . 

 

FINDINGS: 

 

There is mild degree cardiomegaly. Liver, spleen, pancreas and adrenal glands are within normal limit
s. Bilateral kidneys demonstrate normal density without calculi or hydronephrosis. Urinary bladder is
 partially filled with normal outlines. Aorta is of normal caliber. There is no free fluid or free ai
r. Gallbladder contains multiple calculi measuring up to 2.6 cm. Small bowel loops are within normal 
limits. Moderate degree of residual stool is noted. Appendix is not distinctly visualized. There are 
no inflammatory changes in the right lower quadrant. A radiopaque device is noted in the vagina measu
ring 6.2 x 5.0 cm which. A 6.9 x 5.6 cm soft tissue density lesion is noted anterior to the spleen an
d lateral to the gastric fundus. Moderate degree of degenerative changes are noted involving the lumb
ar spine. Vertebral height is normal. 

 

IMPRESSION: Cholelithiasis without any CT evidence of acute cholecystitis 

A soft tissue density mass lesion in the left upper quadrant may represent a mass versus an accessory
 spleen. Postcontrast CT is recommended for further evaluation. 

A Radiopaque device is identified in the vagina. This may represent a prosthetic device versus a fore
ign body. Clinical correlation is recommended. 

 

This document is electronically signed by Kevin Santana MD., April 27 2019 10:45:53 PM ET

## 2019-04-28 VITALS — SYSTOLIC BLOOD PRESSURE: 138 MMHG | DIASTOLIC BLOOD PRESSURE: 66 MMHG

## 2019-04-28 LAB
BILIRUB UR QL STRIP: (no result)
BLOOD UR QL VISUAL: (no result)
MUCOUS THREADS #/AREA URNS HPF: (no result) /HPF
PH UR STRIP: 7 [PH] (ref 5–7)
PROT UR STRIP-MCNC: (no result) MG/DL
RBC #/AREA URNS HPF: 3 /HPF (ref 0–6)
UROBILINOGEN UR-MCNC: < 2 MG/DL (ref ?–2)
WBC #/AREA URNS HPF: < 1 /HPF (ref 0–6)

## 2019-04-28 NOTE — ULTRASOUND REPORT
PROCEDURE: US ABDOMEN LIMITED 

 

TECHNIQUE:  Routine imaging was obtained of the right upper quadrant. 

 

HISTORY: abd pain. cholithiasis 

 

COMPARISONS: CT scan abdomen and pelvis 4/27/2019  

 

FINDINGS: 

 

The gallbladder is normal size and contains numerous stones. There are no secondary signs of acute ch
olecystitis. The common bile duct measures 4.9 mm which is normal. The liver is normal in size and ec
hotexture. The abdominal aorta proximally measures 1.6 cm in diameter. The pancreatic head and body a
ppears normal. The tail is not well seen. The right kidney is remarkable for a prominent renal pelvis
. It measures 11.0 x 3.1 x 6.4 cm. 

 

IMPRESSION:  

 

Gallstones. No secondary signs of acute cholecystitis. 

Normal-appearing liver and biliary tree. 

Prominent right extrarenal pelvis of the right kidney. 

 

This document is electronically signed by Hamlet Parks MD., April 28 2019 12:30:30 AM ET

## 2019-04-28 NOTE — CAT SCAN REPORT
PROCEDURE:  CT HEAD/BRAIN WO CON 

 

TECHNIQUE:  Computerized tomography of the head was performed without contrast material.  

 

CT DOSE LENGTH PRODUCT:  1107.3  mGycm 

 

HISTORY:  ams  

 

COMPARISONS:  August 22, 2018 . 

 

FINDINGS: 

 

Skull and scalp:  Normal . 

Paranasal sinuses:  Normal . 

Ventricles and subarachnoid spaces:  Normal . 

Cerebrum:  No evidence of hemorrhage, acute infarction or mass. Mild atrophy is noted. Slight periven
tricular deep white matter change . 

Cerebellum and brainstem:  There is no evidence of acute hemorrhage or hematoma. Old lacunar infarcti
on of the right roly region of the brainstem. . 

Vasculature:  Normal . 

Other:  None . 

ASPECTS: 10 

 

IMPRESSION:  There is no evidence of an acute intracranial process. Mild atrophy and slight periventr
icular deep white matter changes. Old lacunar infarction of the right orly. . 

 

This document is electronically signed by Terri Crowder DO., April 28 2019 01:18:43 AM ET